# Patient Record
Sex: MALE | Race: WHITE | NOT HISPANIC OR LATINO
[De-identification: names, ages, dates, MRNs, and addresses within clinical notes are randomized per-mention and may not be internally consistent; named-entity substitution may affect disease eponyms.]

---

## 2019-02-14 ENCOUNTER — APPOINTMENT (OUTPATIENT)
Dept: ORTHOPEDIC SURGERY | Facility: CLINIC | Age: 24
End: 2019-02-14
Payer: OTHER GOVERNMENT

## 2019-02-14 VITALS
WEIGHT: 175 LBS | BODY MASS INDEX: 26.52 KG/M2 | HEIGHT: 68 IN | HEART RATE: 57 BPM | DIASTOLIC BLOOD PRESSURE: 71 MMHG | SYSTOLIC BLOOD PRESSURE: 145 MMHG

## 2019-02-14 DIAGNOSIS — Z78.9 OTHER SPECIFIED HEALTH STATUS: ICD-10-CM

## 2019-02-14 DIAGNOSIS — S63.502A UNSPECIFIED SPRAIN OF LEFT WRIST, INITIAL ENCOUNTER: ICD-10-CM

## 2019-02-14 DIAGNOSIS — Z60.2 PROBLEMS RELATED TO LIVING ALONE: ICD-10-CM

## 2019-02-14 DIAGNOSIS — Z56.0 UNEMPLOYMENT, UNSPECIFIED: ICD-10-CM

## 2019-02-14 PROCEDURE — 73110 X-RAY EXAM OF WRIST: CPT | Mod: LT

## 2019-02-14 PROCEDURE — 99214 OFFICE O/P EST MOD 30 MIN: CPT

## 2019-02-14 SDOH — SOCIAL STABILITY - SOCIAL INSECURITY: PROBLEMS RELATED TO LIVING ALONE: Z60.2

## 2019-02-14 SDOH — ECONOMIC STABILITY - INCOME SECURITY: UNEMPLOYMENT, UNSPECIFIED: Z56.0

## 2019-03-09 ENCOUNTER — TRANSCRIPTION ENCOUNTER (OUTPATIENT)
Age: 24
End: 2019-03-09

## 2019-03-10 ENCOUNTER — RESULT REVIEW (OUTPATIENT)
Age: 24
End: 2019-03-10

## 2019-03-10 ENCOUNTER — INPATIENT (INPATIENT)
Facility: HOSPITAL | Age: 24
LOS: 0 days | Discharge: HOME | DRG: 711 | End: 2019-03-11
Attending: UROLOGY | Admitting: UROLOGY
Payer: COMMERCIAL

## 2019-03-10 VITALS
TEMPERATURE: 98 F | HEART RATE: 69 BPM | RESPIRATION RATE: 18 BRPM | HEIGHT: 68 IN | OXYGEN SATURATION: 100 % | WEIGHT: 175.05 LBS | DIASTOLIC BLOOD PRESSURE: 80 MMHG | SYSTOLIC BLOOD PRESSURE: 136 MMHG

## 2019-03-10 DIAGNOSIS — S39.94XA UNSPECIFIED INJURY OF EXTERNAL GENITALS, INITIAL ENCOUNTER: ICD-10-CM

## 2019-03-10 LAB
ALBUMIN SERPL ELPH-MCNC: 4.7 G/DL — SIGNIFICANT CHANGE UP (ref 3.3–5)
ALP SERPL-CCNC: 92 U/L — SIGNIFICANT CHANGE UP (ref 40–120)
ALT FLD-CCNC: 38 U/L — SIGNIFICANT CHANGE UP (ref 10–45)
ANION GAP SERPL CALC-SCNC: 16 MMOL/L — SIGNIFICANT CHANGE UP (ref 5–17)
APTT BLD: 28.5 SEC — SIGNIFICANT CHANGE UP (ref 27.5–36.3)
AST SERPL-CCNC: 36 U/L — SIGNIFICANT CHANGE UP (ref 10–40)
BASOPHILS # BLD AUTO: 0.1 K/UL — SIGNIFICANT CHANGE UP (ref 0–0.2)
BASOPHILS NFR BLD AUTO: 0.5 % — SIGNIFICANT CHANGE UP (ref 0–2)
BILIRUB SERPL-MCNC: 0.4 MG/DL — SIGNIFICANT CHANGE UP (ref 0.2–1.2)
BLD GP AB SCN SERPL QL: NEGATIVE — SIGNIFICANT CHANGE UP
BUN SERPL-MCNC: 12 MG/DL — SIGNIFICANT CHANGE UP (ref 7–23)
CALCIUM SERPL-MCNC: 9.6 MG/DL — SIGNIFICANT CHANGE UP (ref 8.4–10.5)
CHLORIDE SERPL-SCNC: 100 MMOL/L — SIGNIFICANT CHANGE UP (ref 96–108)
CO2 SERPL-SCNC: 23 MMOL/L — SIGNIFICANT CHANGE UP (ref 22–31)
CREAT SERPL-MCNC: 0.95 MG/DL — SIGNIFICANT CHANGE UP (ref 0.5–1.3)
EOSINOPHIL # BLD AUTO: 0.1 K/UL — SIGNIFICANT CHANGE UP (ref 0–0.5)
EOSINOPHIL NFR BLD AUTO: 0.8 % — SIGNIFICANT CHANGE UP (ref 0–6)
GLUCOSE SERPL-MCNC: 91 MG/DL — SIGNIFICANT CHANGE UP (ref 70–99)
HCT VFR BLD CALC: 43.4 % — SIGNIFICANT CHANGE UP (ref 39–50)
HGB BLD-MCNC: 15.1 G/DL — SIGNIFICANT CHANGE UP (ref 13–17)
INR BLD: 1.02 RATIO — SIGNIFICANT CHANGE UP (ref 0.88–1.16)
LYMPHOCYTES # BLD AUTO: 16.7 % — SIGNIFICANT CHANGE UP (ref 13–44)
LYMPHOCYTES # BLD AUTO: 2.5 K/UL — SIGNIFICANT CHANGE UP (ref 1–3.3)
MCHC RBC-ENTMCNC: 31.9 PG — SIGNIFICANT CHANGE UP (ref 27–34)
MCHC RBC-ENTMCNC: 34.9 GM/DL — SIGNIFICANT CHANGE UP (ref 32–36)
MCV RBC AUTO: 91.4 FL — SIGNIFICANT CHANGE UP (ref 80–100)
MONOCYTES # BLD AUTO: 1.3 K/UL — HIGH (ref 0–0.9)
MONOCYTES NFR BLD AUTO: 8.7 % — SIGNIFICANT CHANGE UP (ref 2–14)
NEUTROPHILS # BLD AUTO: 10.8 K/UL — HIGH (ref 1.8–7.4)
NEUTROPHILS NFR BLD AUTO: 73.3 % — SIGNIFICANT CHANGE UP (ref 43–77)
PLATELET # BLD AUTO: 262 K/UL — SIGNIFICANT CHANGE UP (ref 150–400)
POTASSIUM SERPL-MCNC: 4.3 MMOL/L — SIGNIFICANT CHANGE UP (ref 3.5–5.3)
POTASSIUM SERPL-SCNC: 4.3 MMOL/L — SIGNIFICANT CHANGE UP (ref 3.5–5.3)
PROT SERPL-MCNC: 7.5 G/DL — SIGNIFICANT CHANGE UP (ref 6–8.3)
PROTHROM AB SERPL-ACNC: 11.7 SEC — SIGNIFICANT CHANGE UP (ref 10–12.9)
RBC # BLD: 4.75 M/UL — SIGNIFICANT CHANGE UP (ref 4.2–5.8)
RBC # FLD: 11.6 % — SIGNIFICANT CHANGE UP (ref 10.3–14.5)
RH IG SCN BLD-IMP: POSITIVE — SIGNIFICANT CHANGE UP
RH IG SCN BLD-IMP: POSITIVE — SIGNIFICANT CHANGE UP
SODIUM SERPL-SCNC: 139 MMOL/L — SIGNIFICANT CHANGE UP (ref 135–145)
WBC # BLD: 14.8 K/UL — HIGH (ref 3.8–10.5)
WBC # FLD AUTO: 14.8 K/UL — HIGH (ref 3.8–10.5)

## 2019-03-10 PROCEDURE — 93975 VASCULAR STUDY: CPT | Mod: 26

## 2019-03-10 PROCEDURE — 76870 US EXAM SCROTUM: CPT | Mod: 26

## 2019-03-10 PROCEDURE — 99285 EMERGENCY DEPT VISIT HI MDM: CPT

## 2019-03-10 PROCEDURE — 99222 1ST HOSP IP/OBS MODERATE 55: CPT | Mod: AI,57

## 2019-03-10 PROCEDURE — 54670 REPAIR TESTIS INJURY: CPT | Mod: RT

## 2019-03-10 PROCEDURE — 88305 TISSUE EXAM BY PATHOLOGIST: CPT | Mod: 26

## 2019-03-10 RX ORDER — SENNA PLUS 8.6 MG/1
2 TABLET ORAL AT BEDTIME
Qty: 0 | Refills: 0 | Status: DISCONTINUED | OUTPATIENT
Start: 2019-03-10 | End: 2019-03-11

## 2019-03-10 RX ORDER — CEFPODOXIME PROXETIL 100 MG
400 TABLET ORAL
Qty: 0 | Refills: 0 | Status: DISCONTINUED | OUTPATIENT
Start: 2019-03-10 | End: 2019-03-11

## 2019-03-10 RX ORDER — OXYCODONE AND ACETAMINOPHEN 5; 325 MG/1; MG/1
1 TABLET ORAL EVERY 4 HOURS
Qty: 0 | Refills: 0 | Status: DISCONTINUED | OUTPATIENT
Start: 2019-03-10 | End: 2019-03-11

## 2019-03-10 RX ORDER — HYDROMORPHONE HYDROCHLORIDE 2 MG/ML
0.5 INJECTION INTRAMUSCULAR; INTRAVENOUS; SUBCUTANEOUS
Qty: 0 | Refills: 0 | Status: DISCONTINUED | OUTPATIENT
Start: 2019-03-10 | End: 2019-03-11

## 2019-03-10 RX ORDER — SODIUM CHLORIDE 9 MG/ML
1000 INJECTION, SOLUTION INTRAVENOUS
Qty: 0 | Refills: 0 | Status: DISCONTINUED | OUTPATIENT
Start: 2019-03-10 | End: 2019-03-11

## 2019-03-10 RX ORDER — ONDANSETRON 8 MG/1
4 TABLET, FILM COATED ORAL ONCE
Qty: 0 | Refills: 0 | Status: DISCONTINUED | OUTPATIENT
Start: 2019-03-10 | End: 2019-03-11

## 2019-03-10 RX ORDER — ACETAMINOPHEN 500 MG
650 TABLET ORAL EVERY 6 HOURS
Qty: 0 | Refills: 0 | Status: DISCONTINUED | OUTPATIENT
Start: 2019-03-10 | End: 2019-03-11

## 2019-03-10 RX ORDER — ONDANSETRON 8 MG/1
4 TABLET, FILM COATED ORAL EVERY 6 HOURS
Qty: 0 | Refills: 0 | Status: DISCONTINUED | OUTPATIENT
Start: 2019-03-10 | End: 2019-03-11

## 2019-03-10 RX ORDER — OXYCODONE AND ACETAMINOPHEN 5; 325 MG/1; MG/1
2 TABLET ORAL EVERY 6 HOURS
Qty: 0 | Refills: 0 | Status: DISCONTINUED | OUTPATIENT
Start: 2019-03-10 | End: 2019-03-11

## 2019-03-10 RX ORDER — MORPHINE SULFATE 50 MG/1
4 CAPSULE, EXTENDED RELEASE ORAL ONCE
Qty: 0 | Refills: 0 | Status: DISCONTINUED | OUTPATIENT
Start: 2019-03-10 | End: 2019-03-10

## 2019-03-10 RX ORDER — HYDROMORPHONE HYDROCHLORIDE 2 MG/ML
1 INJECTION INTRAMUSCULAR; INTRAVENOUS; SUBCUTANEOUS
Qty: 0 | Refills: 0 | Status: DISCONTINUED | OUTPATIENT
Start: 2019-03-10 | End: 2019-03-11

## 2019-03-10 RX ORDER — DOCUSATE SODIUM 100 MG
100 CAPSULE ORAL THREE TIMES A DAY
Qty: 0 | Refills: 0 | Status: DISCONTINUED | OUTPATIENT
Start: 2019-03-10 | End: 2019-03-11

## 2019-03-10 RX ADMIN — MORPHINE SULFATE 4 MILLIGRAM(S): 50 CAPSULE, EXTENDED RELEASE ORAL at 18:03

## 2019-03-10 NOTE — ED PROVIDER NOTE - CLINICAL SUMMARY MEDICAL DECISION MAKING FREE TEXT BOX
Sobeida Pelletier MD PGY-1 Pt is a 22 yo with no PMH who presents after getting hit in the R testicle with a baseball bat yesterday while playing a game in [place]. He went to a medical center in [place], ultrasound performed which showed a large hematoma with concern for fracture. 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 Pt was told if symptoms did not improve or worsened, should come to the ED and see a urologist by Monday at the latest. This AM pt states that swelling and pain worsened. States he has dull pain on right amena-abdomen radiating from right testicle, but no abd ttp on exam. On exam, R testicle enlarged, with lay lower than left, minimal erythema of r medial thigh, no cremasteric reflex. Concern for testicular hematoma with possible fracture. Testicular torsion less likely given mechanism. Will get labs, repeat ultrasound, urology consult, pain control, reassess.

## 2019-03-10 NOTE — BRIEF OPERATIVE NOTE - PROCEDURE
<<-----Click on this checkbox to enter Procedure Repair of ruptured right testicle  03/10/2019    Active  DANDREAS

## 2019-03-10 NOTE — ED PROVIDER NOTE - GENITOURINARY, MLM
right enlarged testicle  tender to palpation, minimum erythema to medial right thigh right enlarged testicle  tender to palpation, minimum erythema to medial right thigh, exam performed by Dr. Mae Polo and Dr. Sobeida Pelletier, both together in the room

## 2019-03-10 NOTE — PRE-ANESTHESIA EVALUATION ADULT - NSPROPOSEDPROCEDFT_GEN_ALL_CORE
right testicular exploration, scrotal exploration, possible repair of testicular rupture, possible right orchiectomy

## 2019-03-10 NOTE — ED PROVIDER NOTE - PROGRESS NOTE DETAILS
Sobeida Pelletier MD PGY-1 Ultrasound pending. Urology aware of patient and ultrasound results from [facility], which suggest testicular hematoma with possible fracture. Sobeida Pelletier MD PGY-1 urology paged with ultrasound results, final report pending within a few minutes but prelim read is large hematoma with possible fracture and possible rupture (difficult to be certain 2/2 large hematoma). Urology will come see pt in ED. Urology at bedside. Admit to Dr. Angelito Gould, patient to go to OR tonight.

## 2019-03-10 NOTE — H&P ADULT - HISTORY OF PRESENT ILLNESS
24yo male, no PMH, presenting to [facility] c/o right testicular pain s/p trauma yesterday. Pt was playing baseball and hit in right scrotum with ball, not wearing protection. Went to ED down in [place] where US visualized a large right scrotal hematoma and possible fracture vs rupture. Pt was evaluated by the urologist in [place] who did not believe it was a fracture and said he could be discharged with close follow up. Pt returned to [place] last night, pain was increasing today, 6-7/10 at worst, improved  to a 4-5/10 after 1 percocet, but was concerned so came to the ED. Pt describes pain as diffuse throughout entire right scrotum, but more so at lower portion. Pain radiates to right abdomen/groin and to lower back. Denies any fever/chills, urinary sxx, nausea or vomiting.

## 2019-03-10 NOTE — H&P ADULT - NSHPLABSRESULTS_GEN_ALL_CORE
Lab Results:  CBC  CBC Full  -  ( 10 Mar 2019 18:23 )  WBC Count : 14.8 K/uL  Hemoglobin : 15.1 g/dL  Hematocrit : 43.4 %  Platelet Count - Automated : 262 K/uL  Mean Cell Volume : 91.4 fl  Mean Cell Hemoglobin : 31.9 pg  Mean Cell Hemoglobin Concentration : 34.9 gm/dL  Auto Neutrophil # : 10.8 K/uL  Auto Lymphocyte # : 2.5 K/uL  Auto Monocyte # : 1.3 K/uL  Auto Eosinophil # : 0.1 K/uL  Auto Basophil # : 0.1 K/uL  Auto Neutrophil % : 73.3 %  Auto Lymphocyte % : 16.7 %  Auto Monocyte % : 8.7 %  Auto Eosinophil % : 0.8 %  Auto Basophil % : 0.5 %    .		Differential:	[] Automated		[] Manual  Chemistry  03-10    139  |  100  |  12  ----------------------------<  91  4.3   |  23  |  0.95    Ca    9.6      10 Mar 2019 18:23    TPro  7.5  /  Alb  4.7  /  TBili  0.4  /  DBili  x   /  AST  36  /  ALT  38  /  AlkPhos  92  03-10    LIVER FUNCTIONS - ( 10 Mar 2019 18:23 )  Alb: 4.7 g/dL / Pro: 7.5 g/dL / ALK PHOS: 92 U/L / ALT: 38 U/L / AST: 36 U/L / GGT: x           PT/INR - ( 10 Mar 2019 18:23 )   PT: 11.7 sec;   INR: 1.02 ratio         PTT - ( 10 Mar 2019 18:23 )  PTT:28.5 sec      MICROBIOLOGY/CULTURES:    RADIOLOGY RESULTS:  < from: US Doppler Scrotum (03.10.19 @ 18:56) >    FINDINGS:      RIGHT:    Right testis: The right testicle is heterogeneous in echotexture with   anechoic regions which are consistent with fracture and intratesticular   hematoma. The contour is lobulated in some regions which may be secondary   to intratesticular hematoma distorting the contour versus a testicular   rupture. The tunica albuginea is not clearly visualized along the lateral   dorsal aspect of the testicle. There is heterogeneous material   surrounding the testicle, consistent with a large amount of   extratesticular hematoma. There is also overlying skinthickening.    Right epididymis: Within normal limits.      LEFT:     Left testis: 3.5 x 5.2 x 2.6 cm. Normal echogenicity and echotexture with   no masses or areas of architectural distortion. Normal blood flow pattern.    Left epididymis: Within normal limits.    Left hydrocele: None.    Left varicocele: None.    IMPRESSION:   Heterogeneous right testicle with lobulated contour, suggestive of   testicular fracture and intratesticular hematoma. Given the lobulated   contour and lack of distinct visualization of the tunica albuginea along   the lateral/dorsal aspect of the testicle, testicular rupture cannot be   excluded.  Large extratesticular hematoma.  Unremarkable left testicle.      < end of copied text >

## 2019-03-10 NOTE — ED PROVIDER NOTE - NS_ ATTENDINGSCRIBEDETAILS _ED_A_ED_FT
I,Mae Polo, performed the initial face to face bedside interview with this patient regarding history of present illness, review of symptoms and relevant past medical, social and family history.  I completed an independent physical examination.  I was the initial provider who evaluated this patient. The history, relevant review of systems, past medical and surgical history, medical decision making, and physical examination was documented by the scribe in my presence and I attest to the accuracy of the documentation.

## 2019-03-10 NOTE — ED ADULT NURSE NOTE - CHPI ED NUR SYMPTOMS NEG
no abdominal distension/no nausea/no fever/no diarrhea/no chills/no blood in stool/no dysuria/no hematuria

## 2019-03-10 NOTE — H&P ADULT - NSHPPHYSICALEXAM_GEN_ALL_CORE
ICU Vital Signs Last 24 Hrs  T(C): 36.7 (10 Mar 2019 16:51), Max: 36.7 (10 Mar 2019 16:51)  T(F): 98 (10 Mar 2019 16:51), Max: 98 (10 Mar 2019 16:51)  HR: 69 (10 Mar 2019 16:51) (69 - 69)  BP: 136/80 (10 Mar 2019 16:51) (136/80 - 136/80)  BP(mean): --  ABP: --  ABP(mean): --  RR: 18 (10 Mar 2019 16:51) (18 - 18)  SpO2: 100% (10 Mar 2019 16:51) (100% - 100%)      GEN: NAD  abd: soft, NT/ND  : circumcised, nontender phalus, right scrotum diffusely TTP, ecchymosis noted along inferior aspect, swollen, scrotal contents enlarged, no discreet testicle palpable 2/2 swelling vs hematoma, left testicle non tender, no swelling.

## 2019-03-10 NOTE — ED PROVIDER NOTE - OBJECTIVE STATEMENT
24 y/o M with no PMHx c/o right testicular pain radiating into abd yesterday s/p getting hit with a baseball during a baseball game. Pt sates testicle was swollen and heavier. Went to ER in [place] and did US stated concern for rupture was told to fly home and if it got worse go to the ER. Took oxycodone  at 11am with no relief. Denies urinary issues, decreased sensation,  vomiting and appetite issues.  Impression from [place]:  Right hemiscrotum and right testicle are abnormal. Complex fluid around the testicle. Large essentially avascular mixed echogenicity area, mostly hyperechoic, probably a large hematoma given the history. testicular fracture is considered to be given the loss of separation b/w what appears to probably be the hematoma and the testicular parenchyma. 24 y/o M with no PMHx c/o right testicular pain radiating into abd yesterday s/p getting hit with a baseball during a baseball game. Pt sates testicle was swollen and heavier. Went to ER in [place] and did US stated concern for rupture was told to fly home and if it got worse go to the ER. Took oxycodone  at 11am with no relief. Denies urinary issues/hematuria, decreased sensation,  vomiting and appetite issues.  Impression from [place]:  Right hemiscrotum and right testicle are abnormal. Complex fluid around the testicle. Large essentially avascular mixed echogenicity area, mostly hyperechoic, probably a large hematoma given the history. testicular fracture is considered to be given the loss of separation b/w what appears to probably be the hematoma and the testicular parenchyma. 22 y/o M with no PMHx c/o right testicular pain radiating into abd yesterday s/p getting hit with a baseball during a baseball game. Pt sates testicle was swollen and heavier. Went to ER in [place] and did US stated concern for rupture was told to fly home and if it got worse go to the ER. Took oxycodone  at 11am with no relief. Pain radiates to right abdomen and back. Denies urinary issues/hematuria, decreased sensation,  vomiting and appetite issues.  Impression from [place]:  Right hemiscrotum and right testicle are abnormal. Complex fluid around the testicle. Large essentially avascular mixed echogenicity area, mostly hyperechoic, probably a large hematoma given the history. testicular fracture is considered to be given the loss of separation b/w what appears to probably be the hematoma and the testicular parenchyma.

## 2019-03-10 NOTE — ED PROVIDER NOTE - ATTENDING CONTRIBUTION TO CARE
Patient is a 24 yo M with no chronic medical problems here for right testicular pain. He was hit in the testicle with a baseball around 10 AM. He went to be evaluated at a hosptial, had an US done that showed right testicular hematoma and concern for testicular fracture. Patient was seen by urology who per patient reviewed the scan and told him it was ok to fly home to [place]. Patient caught an 8 pm flight to [place]. This morning he felt worse and thinks it is more swollen. He filled his prescription for percocet and states it helped mildly. He came in for worsening pain and swelling.     VS noted  Gen. no acute distress, Non toxic   HEENT: EOMI, mmm  Lungs: CTAB/L no C/ W /R   CVS: RRR   Abd; Soft non tender, non distended   : right testicle swollen (larger than left), ttp, no cremasteric reflex  Ext: no edema  Skin: no rash  Neuro AAOx3 non focal clear speech  a/p: right testicular swelling and pain after trauma, concerning for testicular fracture and hematoma per history. Will repeat US, consult urology, order pre-op labs and give pain medication.   Marisela Polo MD Patient is a 22 yo M with no chronic medical problems here for right testicular pain. He was hit in the testicle with a baseball around 10 AM. He went to be evaluated at a hosptial, had an US done that showed right testicular hematoma and concern for testicular fracture. Patient was seen by urology who per patient reviewed the scan and told him it was ok to fly home to [place]. Patient caught an 8 pm flight to [place]. This morning he felt worse and thinks it is more swollen. He filled his prescription for percocet and states it helped mildly. He came in for worsening pain and swelling. Pain radiates to right abdomen and back.     VS noted  Gen. no acute distress, Non toxic   HEENT: EOMI, mmm  Lungs: CTAB/L no C/ W /R   CVS: RRR   Abd; Soft non tender, non distended   : right testicle swollen (larger than left), ttp, no cremasteric reflex  Ext: no edema  Skin: no rash  Neuro AAOx3 non focal clear speech  a/p: right testicular swelling and pain after trauma, concerning for testicular fracture and hematoma per history. Will repeat US, consult urology, order pre-op labs and give pain medication.   Marisela Polo MD

## 2019-03-10 NOTE — ED ADULT NURSE NOTE - OBJECTIVE STATEMENT
Pt presents for eval of right testicular pain after he was struck on his right testicle by baseball yesterday.  Was seen at hospital in the area and released.  Denies hematuria, n/v. Pt presents for eval of right testicular pain after he was struck on his right testicle by baseball yesterday.  Was seen at hospital in the area and released.  Denies hematuria, n/v.  Genital exam deferred to MD.  Pt advised NPO pending outcome of work-up.

## 2019-03-10 NOTE — H&P ADULT - ASSESSMENT
24yo male with right testicular trauma, r/o testicular fracture vs rupture   - admit to urology  - NPO for urgent OR tonight for scrotal exploration   - pain control

## 2019-03-10 NOTE — ED ADULT NURSE NOTE - NSIMPLEMENTINTERV_GEN_ALL_ED
Implemented All Universal Safety Interventions:  [place] to call system. Call bell, personal items and telephone within reach. Instruct patient to call for assistance. Room bathroom lighting operational. Non-slip footwear when patient is off stretcher. Physically safe environment: no spills, clutter or unnecessary equipment. Stretcher in lowest position, wheels locked, appropriate side rails in place.

## 2019-03-11 ENCOUNTER — TRANSCRIPTION ENCOUNTER (OUTPATIENT)
Age: 24
End: 2019-03-11

## 2019-03-11 VITALS
SYSTOLIC BLOOD PRESSURE: 117 MMHG | RESPIRATION RATE: 18 BRPM | DIASTOLIC BLOOD PRESSURE: 63 MMHG | HEART RATE: 54 BPM | TEMPERATURE: 99 F | OXYGEN SATURATION: 97 %

## 2019-03-11 PROCEDURE — 80053 COMPREHEN METABOLIC PANEL: CPT

## 2019-03-11 PROCEDURE — 88305 TISSUE EXAM BY PATHOLOGIST: CPT

## 2019-03-11 PROCEDURE — 76870 US EXAM SCROTUM: CPT

## 2019-03-11 PROCEDURE — 96374 THER/PROPH/DIAG INJ IV PUSH: CPT

## 2019-03-11 PROCEDURE — 99285 EMERGENCY DEPT VISIT HI MDM: CPT | Mod: 25

## 2019-03-11 PROCEDURE — 86900 BLOOD TYPING SEROLOGIC ABO: CPT

## 2019-03-11 PROCEDURE — 86850 RBC ANTIBODY SCREEN: CPT

## 2019-03-11 PROCEDURE — 86901 BLOOD TYPING SEROLOGIC RH(D): CPT

## 2019-03-11 PROCEDURE — 93975 VASCULAR STUDY: CPT

## 2019-03-11 PROCEDURE — 85027 COMPLETE CBC AUTOMATED: CPT

## 2019-03-11 PROCEDURE — 85610 PROTHROMBIN TIME: CPT

## 2019-03-11 PROCEDURE — 85730 THROMBOPLASTIN TIME PARTIAL: CPT

## 2019-03-11 RX ORDER — DOCUSATE SODIUM 100 MG
1 CAPSULE ORAL
Qty: 0 | Refills: 0 | COMMUNITY
Start: 2019-03-11

## 2019-03-11 RX ORDER — SENNA PLUS 8.6 MG/1
2 TABLET ORAL
Qty: 0 | Refills: 0 | COMMUNITY
Start: 2019-03-11

## 2019-03-11 RX ORDER — CEFPODOXIME PROXETIL 100 MG
2 TABLET ORAL
Qty: 20 | Refills: 0 | OUTPATIENT
Start: 2019-03-11 | End: 2019-03-15

## 2019-03-11 RX ADMIN — Medication 400 MILLIGRAM(S): at 05:37

## 2019-03-11 RX ADMIN — OXYCODONE AND ACETAMINOPHEN 1 TABLET(S): 5; 325 TABLET ORAL at 09:03

## 2019-03-11 RX ADMIN — OXYCODONE AND ACETAMINOPHEN 1 TABLET(S): 5; 325 TABLET ORAL at 09:33

## 2019-03-11 RX ADMIN — Medication 100 MILLIGRAM(S): at 05:37

## 2019-03-11 NOTE — DISCHARGE NOTE PROVIDER - CARE PROVIDER_API CALL
Angelito Gould)  Urology  [address] [place] [address], [place] [address]  Phone: [phone]  Fax: [phone]  Follow Up Time:

## 2019-03-11 NOTE — PROGRESS NOTE ADULT - SUBJECTIVE AND OBJECTIVE BOX
UROLOGY DAILY PROGRESS NOTE:     Subjective: Patient seen and examined at bedside.   No acute issues overnight      Objective:  Vital signs  T(F): , Max: 98 (03-10-19 @ 16:51)  HR: 48 (03-11-19 @ 05:46)  BP: 106/68 (03-11-19 @ 05:46)  SpO2: 98% (03-11-19 @ 05:46)  Wt(kg): --    Output     I&O's Detail    10 Mar 2019 07:01  -  11 Mar 2019 07:00  --------------------------------------------------------  IN:    lactated ringers.: 800 mL    NSModularFluidAdult: 220 mL    Oral Fluid: 480 mL  Total IN: 1500 mL    OUT:    Voided: 700 mL  Total OUT: 700 mL    Total NET: 800 mL          Physical Exam:  Gen: NAD  Abd: soft NTND  Back: no CVAt   : scrotal support mild edema ecchymosis with incisional tenderness    Labs:  03-10  14.8  / 43.4  /0.95                           15.1   14.8  )-----------( 262      ( 10 Mar 2019 18:23 )             43.4     03-10    139  |  100  |  12  ----------------------------<  91  4.3   |  23  |  0.95    Ca    9.6      10 Mar 2019 18:23    TPro  7.5  /  Alb  4.7  /  TBili  0.4  /  DBili  x   /  AST  36  /  ALT  38  /  AlkPhos  92  03-10  LABS/RADIOLOGY RESULTS:                          15.1   14.8  )-----------( 262      ( 10 Mar 2019 18:23 )             43.4   03-10    139  |  100  |  12  ----------------------------<  91  4.3   |  23  |  0.95    Ca    9.6      10 Mar 2019 18:23    TPro  7.5  /  Alb  4.7  /  TBili  0.4  /  DBili  x   /  AST  36  /  ALT  38  /  AlkPhos  92  03-10  PT/INR - ( 10 Mar 2019 18:23 )   PT: 11.7 sec;   INR: 1.02 ratio         PTT - ( 10 Mar 2019 18:23 )  PTT:28.5 secBlood Cultures      PT/INR - ( 10 Mar 2019 18:23 )   PT: 11.7 sec;   INR: 1.02 ratio         PTT - ( 10 Mar 2019 18:23 )  PTT:28.5 sec          Urine Cx:

## 2019-03-11 NOTE — DISCHARGE NOTE PROVIDER - HOSPITAL COURSE
24yo male, no PMH, presenting to [facility] c/o right testicular pain s/p trauma yesterday. Pt was playing baseball and hit in right scrotum with ball, not wearing protection. Went to ED down in [place] where US visualized a large right scrotal hematoma and possible fracture vs rupture. Pt was evaluated by the urologist in [place] who did not believe it was a fracture and said he could be discharged with close follow up. Pt returned to [place] last night, pain was increasing today, 6-7/10 at worst, improved  to a 4-5/10 after 1 percocet, but was concerned so came to the ED. Pt describes pain as diffuse throughout entire right scrotum, but more so at lower portion. Pain radiates to right abdomen/groin and to lower back. Denies any fever/chills, urinary sxx, nausea or vomiting.         Patient had a sonogram which showed R testicular disruption. He was admitted and taken to OR for repair. Postop course was stable.  His pain was controlled, he voided and he tolerated regular diet.  He was sent home with 5 days of Vantin.

## 2019-03-11 NOTE — PROGRESS NOTE ADULT - ASSESSMENT
A/P: 23y Male s/p repair of right testicular rupture POD #1  DVT prophylaxis/OOB  scrotal support  Incentive spirometry  Analgesia and antiemetics as needed  regular Diet  d/c planning

## 2019-03-11 NOTE — DISCHARGE NOTE PROVIDER - NSDCFUADDINST_GEN_ALL_CORE_FT
can remove dressing in 48 hours  continue with scrotal support  finish all antibiotics  follow up in 2 weeks

## 2019-03-11 NOTE — PROGRESS NOTE ADULT - SUBJECTIVE AND OBJECTIVE BOX
Post op Check    Pt seen and examined without complaints. Pain is controlled. Denies SOB/CP/N/V.     Vital Signs Last 24 Hrs  T(C): 36.1 (11 Mar 2019 00:32), Max: 36.7 (10 Mar 2019 16:51)  T(F): 97 (11 Mar 2019 00:32), Max: 98 (10 Mar 2019 16:51)  HR: 66 (11 Mar 2019 00:32) (53 - 88)  BP: 123/72 (11 Mar 2019 00:32) (105/55 - 136/80)  BP(mean): 92 (11 Mar 2019 00:32) (73 - 96)  RR: 15 (11 Mar 2019 00:32) (15 - 18)  SpO2: 96% (11 Mar 2019 00:32) (94% - 100%)    I&O's Summary    10 Mar 2019 07:01  -  11 Mar 2019 01:30  --------------------------------------------------------  IN: 520 mL / OUT: 0 mL / NET: 520 mL        Physical Exam  Gen: NAD, A&Ox3  Pulm: No respiratory distress, no subcostal retractions  CV: RRR, no JVD  Abd: Soft, NT, ND  : scrotal support and fluff in place, right scrotal incision CDI, minimal saturation on dressing                           15.1   14.8  )-----------( 262      ( 10 Mar 2019 18:23 )             43.4       03-10    139  |  100  |  12  ----------------------------<  91  4.3   |  23  |  0.95    Ca    9.6      10 Mar 2019 18:23    TPro  7.5  /  Alb  4.7  /  TBili  0.4  /  DBili  x   /  AST  36  /  ALT  38  /  AlkPhos  92  03-10

## 2019-03-11 NOTE — DISCHARGE NOTE NURSING/CASE MANAGEMENT/SOCIAL WORK - NSDCPNINST_GEN_ALL_CORE
If you have any fever greater than 100.3, foul smelling drainage from incision or nausea or vomiting call MD and come to Emergency Room

## 2019-03-11 NOTE — DISCHARGE NOTE NURSING/CASE MANAGEMENT/SOCIAL WORK - NSDCDPATPORTLINK_GEN_ALL_CORE
You can access the [facility] Patient Portal, offered by [facility], by registering with the following website: [email or web address]

## 2019-03-12 LAB — SURGICAL PATHOLOGY STUDY: SIGNIFICANT CHANGE UP

## 2019-03-26 ENCOUNTER — APPOINTMENT (OUTPATIENT)
Dept: UROLOGY | Facility: CLINIC | Age: 24
End: 2019-03-26
Payer: COMMERCIAL

## 2019-03-26 VITALS
HEART RATE: 74 BPM | HEIGHT: 68 IN | BODY MASS INDEX: 26.52 KG/M2 | TEMPERATURE: 98 F | SYSTOLIC BLOOD PRESSURE: 113 MMHG | DIASTOLIC BLOOD PRESSURE: 65 MMHG | WEIGHT: 175 LBS | RESPIRATION RATE: 17 BRPM

## 2019-03-26 DIAGNOSIS — S31.30XA UNSPECIFIED OPEN WOUND OF SCROTUM AND TESTES, INITIAL ENCOUNTER: ICD-10-CM

## 2019-03-26 PROCEDURE — 99024 POSTOP FOLLOW-UP VISIT: CPT

## 2019-03-26 NOTE — PHYSICAL EXAM
[General Appearance - Well Developed] : well developed [General Appearance - Well Nourished] : well nourished [Normal Appearance] : normal appearance [Well Groomed] : well groomed [General Appearance - In No Acute Distress] : no acute distress [Edema] : no peripheral edema [] : no respiratory distress [Respiration, Rhythm And Depth] : normal respiratory rhythm and effort [Exaggerated Use Of Accessory Muscles For Inspiration] : no accessory muscle use [Abdomen Soft] : soft [Abdomen Tenderness] : non-tender [Costovertebral Angle Tenderness] : no ~M costovertebral angle tenderness [Urethral Meatus] : meatus normal [Penis Abnormality] : normal circumcised penis [Urinary Bladder Findings] : the bladder was normal on palpation [Testes Tenderness] : no tenderness of the testes [Testes Mass (___cm)] : there were no testicular masses [FreeTextEntry1] : Right scrotal incision c/d/i.  Mild swelling of right testis but no tenderness.  No skin erythema. [Normal Station and Gait] : the gait and station were normal for the patient's age [No Palpable Adenopathy] : no palpable adenopathy

## 2019-03-26 NOTE — ASSESSMENT
[FreeTextEntry1] : Recommend baseline scrotal US to assess testis doppler blood flow.\par Testis/scrotal swelling continuing to improve.\par Suspect that will have partial atrophy of the right testis.\par \par No restrictions on activities.  \par \par Will call with US results.

## 2019-03-26 NOTE — HISTORY OF PRESENT ILLNESS
[FreeTextEntry1] : Present to ER with right scrotal swelling and pain.\par Symptoms occurred after being hit with baseball during game.\par US shows complex fluid in the right hemiscrotum.\par Taken to OR 3/10/2019.  Intraop: testis rupture with 50% opening in the tunica.  Repaired and discharged on POD 1.\par \par Since then healing well. No pain, minimal swelling.  Incision healing well.

## 2019-03-31 ENCOUNTER — FORM ENCOUNTER (OUTPATIENT)
Age: 24
End: 2019-03-31

## 2019-04-01 ENCOUNTER — OUTPATIENT (OUTPATIENT)
Dept: OUTPATIENT SERVICES | Facility: HOSPITAL | Age: 24
LOS: 1 days | End: 2019-04-01
Payer: COMMERCIAL

## 2019-04-01 ENCOUNTER — APPOINTMENT (OUTPATIENT)
Dept: ULTRASOUND IMAGING | Facility: IMAGING CENTER | Age: 24
End: 2019-04-01
Payer: COMMERCIAL

## 2019-04-01 DIAGNOSIS — S31.30XA UNSPECIFIED OPEN WOUND OF SCROTUM AND TESTES, INITIAL ENCOUNTER: ICD-10-CM

## 2019-04-01 PROCEDURE — 76870 US EXAM SCROTUM: CPT | Mod: 26

## 2019-04-01 PROCEDURE — 76870 US EXAM SCROTUM: CPT

## 2019-10-02 PROBLEM — Z60.2 PERSON LIVING ALONE: Status: ACTIVE | Noted: 2019-02-14

## 2020-12-30 NOTE — DISCHARGE NOTE NURSING/CASE MANAGEMENT/SOCIAL WORK - NSTOBACCONEVERSMOKERY/N_GEN_A
No Azathioprine Pregnancy And Lactation Text: This medication is Pregnancy Category D and isn't considered safe during pregnancy. It is unknown if this medication is excreted in breast milk.

## 2022-02-16 NOTE — PRE-ANESTHESIA EVALUATION ADULT - NSANTHASARD_GEN_ALL_CORE
Detail Level: Simple Additional Notes: Patient consent was obtained to proceed with the visit and recommended plan of care after discussion of all risks and benefits, including the risks of COVID-19 exposure. 1E

## 2022-04-28 NOTE — PRE-ANESTHESIA EVALUATION ADULT - NS MD HP INPLANTS MED DEV
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
